# Patient Record
Sex: FEMALE | Race: BLACK OR AFRICAN AMERICAN
[De-identification: names, ages, dates, MRNs, and addresses within clinical notes are randomized per-mention and may not be internally consistent; named-entity substitution may affect disease eponyms.]

---

## 2017-03-06 ENCOUNTER — HOSPITAL ENCOUNTER (EMERGENCY)
Dept: HOSPITAL 74 - JER | Age: 27
Discharge: LEFT BEFORE DISCHARGE | End: 2017-03-06
Payer: COMMERCIAL

## 2017-03-06 VITALS — DIASTOLIC BLOOD PRESSURE: 62 MMHG | HEART RATE: 77 BPM | SYSTOLIC BLOOD PRESSURE: 115 MMHG

## 2017-03-06 VITALS — BODY MASS INDEX: 20.9 KG/M2

## 2017-03-06 VITALS — TEMPERATURE: 97.7 F
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
## 2017-03-06 DIAGNOSIS — S62.636A: ICD-10-CM

## 2017-03-06 DIAGNOSIS — R55: Primary | ICD-10-CM
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
## 2017-03-06 LAB
ALBUMIN SERPL-MCNC: 3.7 G/DL (ref 3.4–5)
ALP SERPL-CCNC: 45 U/L (ref 45–117)
ALT SERPL-CCNC: 11 U/L (ref 12–78)
ANION GAP SERPL CALC-SCNC: 8 MMOL/L (ref 8–16)
APPEARANCE UR: CLEAR
AST SERPL-CCNC: 7 U/L (ref 15–37)
BACTERIA #/AREA URNS HPF: (no result) /HPF
BASOPHILS # BLD: 0.7 % (ref 0–2)
BILIRUB SERPL-MCNC: 0.4 MG/DL (ref 0.2–1)
BILIRUB UR STRIP.AUTO-MCNC: NEGATIVE MG/DL
CALCIUM SERPL-MCNC: 8.3 MG/DL (ref 8.5–10.1)
CO2 SERPL-SCNC: 26 MMOL/L (ref 21–32)
COLOR UR: YELLOW
CREAT SERPL-MCNC: 0.6 MG/DL (ref 0.55–1.02)
DEPRECATED RDW RBC AUTO: 13.6 % (ref 11.6–15.6)
EOSINOPHIL # BLD: 2.1 % (ref 0–4.5)
GLUCOSE SERPL-MCNC: 102 MG/DL (ref 74–106)
HYALINE CASTS URNS QL MICRO: 11 /LPF
KETONES UR QL STRIP: NEGATIVE
LEUKOCYTE ESTERASE UR QL STRIP.AUTO: NEGATIVE
MCH RBC QN AUTO: 30.7 PG (ref 25.7–33.7)
MCHC RBC AUTO-ENTMCNC: 32.6 G/DL (ref 32–36)
MCV RBC: 94.3 FL (ref 80–96)
MUCOUS THREADS URNS QL MICRO: (no result)
NEUTROPHILS # BLD: 66.7 % (ref 42.8–82.8)
NITRITE UR QL STRIP: NEGATIVE
PH UR: 6 [PH] (ref 5–8)
PLATELET # BLD AUTO: 246 K/MM3 (ref 134–434)
PMV BLD: 8.4 FL (ref 7.5–11.1)
PROT SERPL-MCNC: 6.7 G/DL (ref 6.4–8.2)
PROT UR QL STRIP: NEGATIVE
PROT UR QL STRIP: NEGATIVE
RBC # BLD AUTO: 1 /HPF (ref 0–3)
RBC # UR STRIP: (no result) /UL
SP GR UR: 1.02 (ref 1–1.03)
UROBILINOGEN UR STRIP-MCNC: (no result) E.U./DL (ref 0.2–1)
WBC # BLD AUTO: 6.5 K/MM3 (ref 4–10)
WBC # UR AUTO: 1 /HPF (ref 3–5)

## 2017-03-06 PROCEDURE — 2W3JX1Z IMMOBILIZATION OF RIGHT FINGER USING SPLINT: ICD-10-PCS | Performed by: EMERGENCY MEDICINE

## 2017-03-06 NOTE — PDOC
*Physical Exam





- Vital Signs


 Last Vital Signs











Temp Pulse Resp BP Pulse Ox


 


 97.7 F   77   18   115/62   99 


 


 03/06/17 11:27  03/06/17 14:05  03/06/17 14:05  03/06/17 14:05  03/06/17 14:05














ED Treatment Course





- LABORATORY


CBC & Chemistry Diagram: 


 03/06/17 11:45





 03/06/17 11:45





- ADDITIONAL ORDERS


Additional order review: 


 Laboratory  Results











  03/06/17 03/06/17





  12:00 11:45


 


Sodium   142


 


Potassium   4.1


 


Chloride   108 H


 


Carbon Dioxide   26


 


Anion Gap   8


 


BUN   10  D


 


Creatinine   0.6  D


 


Creat Clearance w eGFR   > 60


 


Random Glucose   102


 


Calcium   8.3 L


 


Total Bilirubin   0.4


 


AST   7 L D


 


ALT   11 L D


 


Alkaline Phosphatase   45


 


Total Protein   6.7


 


Albumin   3.7


 


Urine Color  Yellow 


 


Urine Appearance  Clear 


 


Urine pH  6.0 


 


Ur Specific Gravity  1.025 


 


Urine Protein  Negative 


 


Urine Glucose (UA)  Negative 


 


Urine Ketones  Negative 


 


Urine Blood  1+ H 


 


Urine Nitrite  Negative 


 


Urine Bilirubin  Negative 


 


Urine Urobilinogen  2.0 e.u/dl H 


 


Ur Leukocyte Esterase  Negative 


 


Urine RBC  1 


 


Urine WBC  1 


 


Ur Epithelial Cells  Rare 


 


Urine Bacteria  Rare 


 


Hyaline Casts  11 


 


Urine Mucus  Many 


 


Urine HCG, Qual  Negative 








 











  03/06/17





  11:45


 


RBC  3.67


 


MCV  94.3


 


MCHC  32.6


 


RDW  13.6


 


MPV  8.4


 


Neutrophils %  66.7  D


 


Lymphocytes %  23.7  D


 


Monocytes %  6.8


 


Eosinophils %  2.1  D


 


Basophils %  0.7














- RADIOLOGY


Radiology Studies Ordered: 














 Category Date Time Status


 


 HEAD CT WITHOUT CONTRAST [CT] Stat CT Scan  03/06/17 11:57 Completed


 


 FINGER(S) RIGHT [RAD] Stat Radiology  03/06/17 11:55 Completed














- Medications


Given in the ED: 


ED Medications














Discontinued Medications














Generic Name Dose Route Start Last Admin





  Trade Name Freq  PRN Reason Stop Dose Admin


 


Sodium Chloride  1,000 mls @ 1,000 mls/hr 03/06/17 11:57 03/06/17 12:19





  Normal Saline -  IV 03/06/17 12:56  1,000 mls/hr





  ASDIR STA   Administration


 


Ibuprofen  800 mg 03/06/17 11:55 03/06/17 12:19





  Motrin -  PO 03/06/17 11:56  800 mg





  ONCE ONE   Administration


 


Ondansetron HCl  4 mg 03/06/17 11:55 03/06/17 12:19





  Zofran -  PO 03/06/17 11:56  4 mg





  ONCE ONE   Administration














*DC/Admit/Observation/Transfer


Diagnosis at time of Disposition: 


 Finger fracture, right





Syncope


Qualifiers:


 Syncope type: vasovagal syncope Qualified Code(s): R55 - Syncope and collapse





- Discharge Dispostion


Disposition: AGAINST MEDICAL ADVICE


Condition at time of disposition: Stable





- Referrals


Referrals: 


Stephany Dutton [Other]


Dakotah Schultz MD [Staff Physician] - 





- Patient Instructions


Printed Discharge Instructions:  DI for Syncope in Adults (Fainting), Finger 

Fracture


Additional Instructions: 


Please follow-up with your PMD tomorrow.  You can return to ER at any point to 

continue evaluation.


Regarding her finger fracture.  Keep splint in place take Motrin as needed and 

follow-up with orthopedic in 1-2 weeks





- Post Discharge Activity


Work/School Note:  Back to School

## 2017-03-06 NOTE — PDOC
History of Present Illness





- General


Chief Complaint: Syncope/Near Syncope


Stated Complaint: NAUSEA, SYNCOPE, VOMITING


Time Seen by Provider: 03/06/17 11:48


History Source: Patient





- History of Present Illness


Presenting Symptoms: Abdominal Pain, Nausea


Timing/Duration: reports: intermittent





Past History





- Past Medical History


Allergies/Adverse Reactions: 


 Allergies











Allergy/AdvReac Type Severity Reaction Status Date / Time


 


amoxicillin [Amoxicillin] Allergy   Verified 03/06/17 11:26


 


Penicillins Allergy   Verified 03/06/17 11:26











Home Medications: 


Ambulatory Orders





No Home Medications 0 dose .ROUTE UTDICT 07/01/14 








Cardiac Disorders: Yes (syncope)


Suicide Attempt (Hx): No





- Surgical History


Appendectomy: Yes





- Psycho/Social/Smoking Cessation Hx


Anxiety: No


Suicidal Ideation: No


Smoking Status: No


Smoking History: Former smoker


Have you smoked in the past 12 months: No


Number of Cigarettes Smoked Daily: 0


Information on smoking cessation initiated: No


Hx Alcohol Use: No


Drug/Substance Use Hx: No


Substance Use Type: None





**Review of Systems





- Review of Systems


Constitutional: No: Chills, Fever


HEENTM: No: Blurred Vision


Respiratory: No: Shortness of Breath


Cardiac (ROS): Yes: Lightheadedness, Syncope.  No: Chest Pain, Palpitations


ABD/GI: Yes: Nausea, Abdominal cramping


Neurological: Yes: Dizziness.  No: Headache





*Physical Exam





- Vital Signs


 Last Vital Signs











Temp Pulse Resp BP Pulse Ox


 


 97.7 F   77   18   115/62   99 


 


 03/06/17 11:27  03/06/17 14:05  03/06/17 14:05  03/06/17 14:05  03/06/17 14:05














- Physical Exam


Comments: 





03/06/17 12:12


Appears mildly uncomfortable in ED





General Appearance: Yes: Appropriately Dressed


HEENT: positive: Normal Voice


Neck: positive: Supple


Respiratory/Chest: positive: Lungs Clear, Normal Breath Sounds.  negative: 

Respiratory Distress


Cardiovascular: positive: Regular Rate, S1, S2


Gastrointestinal/Abdominal: positive: Soft


Integumentary: positive: Dry, Warm


Neurologic: positive: Fully Oriented, Alert, Normal Mood/Affect, Motor Strength 

5/5.  negative: Facial Droop, Confused





**Heart Score/ECG Review





- ECG Intrepretation


Comment:: 





03/06/17 13:17


Twelve-lead EKG was performed and reviewed by me. There is normal sinus rhythm 

with a normal rate. The axis is normal. The intervals are normal. There are no 

ST or T wave abnormalities.





Impression: Normal twelve-lead EKG





ED Treatment Course





- LABORATORY


CBC & Chemistry Diagram: 


 03/06/17 11:45





 03/06/17 11:45





- ADDITIONAL ORDERS


Additional order review: 


 Laboratory  Results











  03/06/17 03/06/17





  12:00 11:45


 


Sodium   142


 


Potassium   4.1


 


Chloride   108 H


 


Carbon Dioxide   26


 


Anion Gap   8


 


BUN   10  D


 


Creatinine   0.6  D


 


Creat Clearance w eGFR   > 60


 


Random Glucose   102


 


Calcium   8.3 L


 


Total Bilirubin   0.4


 


AST   7 L D


 


ALT   11 L D


 


Alkaline Phosphatase   45


 


Total Protein   6.7


 


Albumin   3.7


 


Urine Color  Yellow 


 


Urine Appearance  Clear 


 


Urine pH  6.0 


 


Ur Specific Gravity  1.025 


 


Urine Protein  Negative 


 


Urine Glucose (UA)  Negative 


 


Urine Ketones  Negative 


 


Urine Blood  1+ H 


 


Urine Nitrite  Negative 


 


Urine Bilirubin  Negative 


 


Urine Urobilinogen  2.0 e.u/dl H 


 


Ur Leukocyte Esterase  Negative 


 


Urine RBC  1 


 


Urine WBC  1 


 


Ur Epithelial Cells  Rare 


 


Urine Bacteria  Rare 


 


Hyaline Casts  11 


 


Urine Mucus  Many 


 


Urine HCG, Qual  Negative 








 











  03/06/17





  11:45


 


RBC  3.67


 


MCV  94.3


 


MCHC  32.6


 


RDW  13.6


 


MPV  8.4


 


Neutrophils %  66.7  D


 


Lymphocytes %  23.7  D


 


Monocytes %  6.8


 


Eosinophils %  2.1  D


 


Basophils %  0.7














- RADIOLOGY


Radiology Studies Ordered: 














 Category Date Time Status


 


 HEAD CT WITHOUT CONTRAST [CT] Stat CT Scan  03/06/17 11:57 Completed


 


 FINGER(S) RIGHT [RAD] Stat Radiology  03/06/17 11:55 Completed














- Medications


Given in the ED: 


ED Medications














Discontinued Medications














Generic Name Dose Route Start Last Admin





  Trade Name Freq  PRN Reason Stop Dose Admin


 


Sodium Chloride  1,000 mls @ 1,000 mls/hr 03/06/17 11:57 03/06/17 12:19





  Normal Saline -  IV 03/06/17 12:56  1,000 mls/hr





  ASDIR STA   Administration


 


Ibuprofen  800 mg 03/06/17 11:55 03/06/17 12:19





  Motrin -  PO 03/06/17 11:56  800 mg





  ONCE ONE   Administration


 


Ondansetron HCl  4 mg 03/06/17 11:55 03/06/17 12:19





  Zofran -  PO 03/06/17 11:56  4 mg





  ONCE ONE   Administration














Medical Decision Making





- Medical Decision Making


03/06/17 11:56


26-year-old female, endorses history of recurrent syncope, here with multiple 

syncopal episodes over the past 3 days.  Patient states 3 days ago while home, 

she became dizzy and passed out and woke up on the floor. Did not not have any 

headache immediately after event but does report R 5th finger pain and 

swelling.  States she stayed in bed all day yesterday and felt better, but this 

a.m. while standing outdoors, pt again became lightheaded and passed out and 

again awoke on the floor.  States event this am was witnessed by bystanders and 

no seizure-like activities reported.  Patient reports feeling generally weak 

right now and "dehydrated." Reports having had similar syncopal events for the 

past several years, usually goes to the ER and discharged but has never seen a 

neurologist or cardiologist.  Pt states she noticed that symptoms occur usually 

around the time of her menses and states she suffers form very painful periods. 

Reports that menses came around 5am today and now complaining of her usual 

lower abdominal cramping and nausea. Pt denies vertigo, visual changes, slurred 

speech, HA or focal weakness. No CP or SOB








See exam








Recurrent syncope


Usually triggered by menses


Seen in outside EDs


No neuro/cards w/u in past


Stable in ED w/ unremarkable exam


?vasovagal, r/o orthostasis, unlikely cardiogenic


-ekg


-labs


-pain control


-zofran











03/06/17 12:08








03/06/17 13:16








03/06/17 14:05


Labs/ekg/CT head unremarkable. Pt stable in ED. Case d/w ED attg and decision 

was made to admit to obs for cards and neuro w/u given no workup in past and 

multiple syncopal events in 3 days w/ injury





03/06/17 14:07








03/06/17 14:30


Patient refusing to be admitted.  States she wants to sign out AGAINST MEDICAL 

ADVICE.  Medical risks explained to patient, but still refusing admission. 

States she will walk into her PMDs office tomorrow.  Patient understands that 

she can return at any point to continue evaluation.  Given finger splint and 

orthopedic follow-up for finger fracture. Copy of labs also given to pt





03/06/17 14:37








*DC/Admit/Observation/Transfer


Diagnosis at time of Disposition: 


 Finger fracture, right





Syncope


Qualifiers:


 Syncope type: vasovagal syncope Qualified Code(s): R55 - Syncope and collapse





- Discharge Dispostion


Disposition: AGAINST MEDICAL ADVICE


Condition at time of disposition: Stable





- Referrals


Referrals: 


Stephany Dutton [Other]


Dakotah Schultz MD [Staff Physician] - 





- Patient Instructions


Printed Discharge Instructions:  DI for Syncope in Adults (Fainting), Finger 

Fracture


Additional Instructions: 


Please follow-up with your PMD tomorrow.  You can return to ER at any point to 

continue evaluation.


Regarding her finger fracture.  Keep splint in place take Motrin as needed and 

follow-up with orthopedic in 1-2 weeks

## 2017-03-07 NOTE — EKG
Test Reason : 

Blood Pressure : ***/*** mmHG

Vent. Rate : 065 BPM     Atrial Rate : 065 BPM

   P-R Int : 134 ms          QRS Dur : 078 ms

    QT Int : 376 ms       P-R-T Axes : 034 002 029 degrees

   QTc Int : 391 ms

 

NORMAL SINUS RHYTHM

NORMAL ECG

WHEN COMPARED WITH ECG OF 08-MAR-2012 05:34,

VENT. RATE HAS DECREASED BY  45 BPM

T WAVE VARIATION

Confirmed by TONJA TY MD ([id number]) on 3/7/2017 11:02:19 AM

 

Referred By:             Confirmed By:TONJA TY MD

## 2017-05-07 ENCOUNTER — HOSPITAL ENCOUNTER (EMERGENCY)
Dept: HOSPITAL 74 - FER | Age: 27
Discharge: HOME | End: 2017-05-07
Payer: COMMERCIAL

## 2017-05-07 VITALS — DIASTOLIC BLOOD PRESSURE: 95 MMHG | HEART RATE: 74 BPM | TEMPERATURE: 98.8 F | SYSTOLIC BLOOD PRESSURE: 138 MMHG

## 2017-05-07 VITALS — BODY MASS INDEX: 20.9 KG/M2

## 2017-05-07 DIAGNOSIS — Z87.891: ICD-10-CM

## 2017-05-07 DIAGNOSIS — O20.0: Primary | ICD-10-CM

## 2017-05-07 DIAGNOSIS — Z3A.00: ICD-10-CM

## 2017-05-07 NOTE — PDOC
History of Present Illness





- General


History Source: Patient


Exam Limitations: No Limitations





- History of Present Illness


Initial Comments: 





17 21:37


The patient is a 26 year old female, with no significant past medical history, 

who presents to the emergency department with complains of prolonged menstrual 

bleeding since April. The patient reports that she has recently taken 4 

pregnancy tests that all came back positive. The patient notes that she got her 

period on  and the bleeding has been persistent since. She notes that 

the blood is brown in color. She states that her period is normally regular. 

She denies having any pain. 





Allergies: amoxicillin, penicillins


Past surgical history: appendectomy


Social history: Former smoker, social alcohol use





<Karla Palm - Last Filed: 17 21:37>





<Deven Ryan - Last Filed: 17 03:34>





- General


Chief Complaint: Vaginal Bleeding


Stated Complaint: PROLONGED MENSTRUAL BLEEDING


Time Seen by Provider: 17 21:27





Past History





<Karla Palm - Last Filed: 17 21:37>





- Past Medical History


Cardiac Disorders: Yes (syncope)


Suicide Attempt (Hx): No





- Surgical History


Appendectomy: Yes





- Reproductive History


Is Patient Pregnant Now?: Yes





- Psycho/Social/Smoking Cessation Hx


Anxiety: No


Suicidal Ideation: No


Smoking Status: No


Smoking History: Former smoker


Have you smoked in the past 12 months: No


Number of Cigarettes Smoked Daily: 0


Information on smoking cessation initiated: No


Hx Alcohol Use: Yes (SOCIAL)


Drug/Substance Use Hx: No


Substance Use Type: None





<Deven Ryan - Last Filed: 17 03:34>





- Past Medical History


Allergies/Adverse Reactions: 


 Allergies











Allergy/AdvReac Type Severity Reaction Status Date / Time


 


amoxicillin [Amoxicillin] Allergy   Verified 17 11:26


 


Penicillins Allergy   Verified 17 11:26











Home Medications: 


Ambulatory Orders





No Home Medications 0 dose .ROUTE UTDICT 14 


Prenatal Vit #108/Iron/FA [Prenatal One Tablet] 1 each PO DAILY #30 tablet  











*Physical Exam





- Vital Signs


 Last Vital Signs











Temp Pulse Resp BP Pulse Ox


 


 98.8 F   74   15   138/95   97 


 


 17 21:05  17 21:05  17 21:05  17 21:05  17 21:05














<Karla Palm - Last Filed: 17 21:37>





- Vital Signs


 Last Vital Signs











Temp Pulse Resp BP Pulse Ox


 


 98.8 F   74   15   138/95   97 


 


 17 21:05  17 21:05  17 21:05  17 21:05  17 21:05














- Physical Exam


General Appearance: Yes: Nourished


HEENT: positive: Normal Voice


Respiratory/Chest: positive: Lungs Clear, Normal Breath Sounds


Cardiovascular: positive: Regular Rate


Female Pelvic Exam: positive: normal external exam, cervical os closed, normal 

size ovaries, other (scant bleeding from os).  negative: adnexal tenderness, 

uterus


Lymphatic: negative: Adenopathy


Musculoskeletal: positive: Normal Inspection


Extremity: positive: Normal Capillary Refill


Integumentary: positive: Normal Color


Neurologic: positive: Fully Oriented





<Deven Ryan - Last Filed: 17 03:34>





ED Treatment Course





- ADDITIONAL ORDERS


Additional order review: 


 Laboratory  Results











  17





  21:21


 


Urine HCG, Qual  Positive














<Karla Palm - Last Filed: 17 21:37>





Medical Decision Making





- Medical Decision Making





17 03:32


POCUS: endometrial stripe, CL cyst R ovary, no ff





Blood type: O+


a/p early pregnancy


ectopic not ruled out but nothing on hx of pe suggests this





has gyn for fu


PNV





<Deven Ryan - Last Filed: 17 03:34>





*DC/Admit/Observation/Transfer





- Attestations


Scribe Attestion: 





17 21:38





Documentation prepared by MADISON Roach, acting as medical scribe for 

Deven Ryan MD.





<Karla Palm - Last Filed: 17 21:37>





<Deven Ryan - Last Filed: 17 03:34>


Diagnosis at time of Disposition: 


 Threatened miscarriage in early pregnancy





- Discharge Dispostion


Disposition: HOME


Condition at time of disposition: Good





- Prescriptions


Prescriptions: 


Prenatal Vit #108/Iron/FA [Prenatal One Tablet] 1 each PO DAILY #30 tablet





- Patient Instructions


Printed Discharge Instructions:  DI for Threatened 


Additional Instructions: 


follow up with your gynecologist as soon as possible

## 2017-05-12 ENCOUNTER — HOSPITAL ENCOUNTER (EMERGENCY)
Dept: HOSPITAL 74 - JER | Age: 27
LOS: 1 days | Discharge: HOME | End: 2017-05-13
Payer: COMMERCIAL

## 2017-05-12 VITALS — HEART RATE: 77 BPM | SYSTOLIC BLOOD PRESSURE: 138 MMHG | TEMPERATURE: 97.4 F | DIASTOLIC BLOOD PRESSURE: 76 MMHG

## 2017-05-12 VITALS — BODY MASS INDEX: 25 KG/M2

## 2017-05-12 DIAGNOSIS — O02.1: Primary | ICD-10-CM

## 2017-05-12 DIAGNOSIS — Z3A.01: ICD-10-CM

## 2017-05-12 LAB
BASOPHILS # BLD: 1.1 % (ref 0–2)
DEPRECATED RDW RBC AUTO: 15.3 % (ref 11.6–15.6)
EOSINOPHIL # BLD: 2.4 % (ref 0–4.5)
MCH RBC QN AUTO: 29.1 PG (ref 25.7–33.7)
MCHC RBC AUTO-ENTMCNC: 32.8 G/DL (ref 32–36)
MCV RBC: 88.9 FL (ref 80–96)
NEUTROPHILS # BLD: 49.3 % (ref 42.8–82.8)
PLATELET # BLD AUTO: 252 K/MM3 (ref 134–434)
PMV BLD: 8.6 FL (ref 7.5–11.1)
WBC # BLD AUTO: 6.9 K/MM3 (ref 4–10)

## 2017-05-12 NOTE — PDOC
History of Present Illness





- History of Present Illness


Initial Comments: 





05/13/17 00:24


Patient is a 26 year old female with no significant medical hx who is 

presenting to the ED with persistent vaginal spotting since 04/27/17. The 

patient reports passing small amounts of clots over the past few weeks. She 

recently took a pregnancy test that was found positive. Several days ago the 

patient was tested and found to have a beta hCG of 831 and upon repeat went 

down to 793. Patient also notes having irregular menses. The patient denies any 

other symptoms including abdominal pain, nausea, vomiting, fever, chills. She 

came to the ED for further evaluation of her spotting. 


Patient states she's been seen by multiple doctors, including a visit to [facility] on 05/07, for the same complaint.





Allergies: amoxicillin, penicillins


Past surgical history: appendectomy


Social history: Former smoker, social alcohol use





<Wendy Camp - Last Filed: 05/13/17 02:06>





- General


History Source: Patient


Exam Limitations: No Limitations





<Antoine Mays - Last Filed: 05/13/17 02:09>





- General


Stated Complaint: VAGINAL BLEEDING/CRAMPS


Time Seen by Provider: 05/12/17 23:33





Past History





<Wendy Camp - Last Filed: 05/13/17 02:06>





- Past Medical History


Cardiac Disorders: Yes (syncope)


Suicide Attempt (Hx): No





- Surgical History


Appendectomy: Yes





- Psycho/Social/Smoking Cessation Hx


Anxiety: No


Suicidal Ideation: No


Smoking Status: No


Smoking History: Former smoker


Have you smoked in the past 12 months: No


Number of Cigarettes Smoked Daily: 0


Hx Alcohol Use: Yes (SOCIAL)


Drug/Substance Use Hx: No


Substance Use Type: None





<Antoine Mays - Last Filed: 05/13/17 02:09>





- Past Medical History


Allergies/Adverse Reactions: 


 Allergies











Allergy/AdvReac Type Severity Reaction Status Date / Time


 


amoxicillin [Amoxicillin] Allergy   Verified 05/12/17 23:50


 


Penicillins Allergy   Verified 05/12/17 23:50











Home Medications: 


Ambulatory Orders





Prenatal Vit #108/Iron/FA [Prenatal One Tablet] 1 each PO DAILY #30 tablet 05/07 /17 











**Review of Systems





- Review of Systems


Comments:: 


05/13/17 00:33


GENERAL/CONSTITUTIONAL: No fever or chills. No weakness.


HEAD, EYES, EARS, NOSE AND THROAT: No change in vision. No ear pain or 

discharge. No sore throat.


CARDIOVASCULAR: No chest pain or shortness of breath.


RESPIRATORY: No cough, wheezing, or hemoptysis.


GASTROINTESTINAL: No nausea, vomiting, diarrhea or constipation.


GENITOURINARY: Vaginal spotting. No dysuria, frequency, or change in urination.


MUSCULOSKELETAL: No joint or muscle swelling or pain. No neck or back pain.


SKIN: No rash


NEUROLOGIC: No headache, vertigo, loss of consciousness, or change in strength/

sensation.





<Wendy Camp - Last Filed: 05/13/17 02:06>





*Physical Exam





- Vital Signs


 Last Vital Signs











Temp Pulse Resp BP Pulse Ox


 


 97.4 F L  77   18   138/76   100 


 


 05/12/17 23:51  05/12/17 23:51  05/12/17 23:51  05/12/17 23:51  05/12/17 23:51














- Physical Exam


Comments: 


05/13/17 00:34


GENERAL: Awake, alert, and fully oriented, in no acute distress


HEAD: No signs of trauma


EYES: PERRLA, EOMI, sclera anicteric, conjunctiva clear


ENT: Auricles normal inspection, hearing grossly normal, nares patent, 

oropharynx clear without 


exudates. Moist mucosa


NECK: Normal ROM, supple, no lymphadenopathy, JVD, or masses


LUNGS: Breath sounds equal, clear to auscultation bilaterally.  No wheezes, and 

no crackles


HEART: Regular rate and rhythm, normal S1 and S2, no murmurs, rubs or gallops


ABDOMEN: Soft, nontender, normoactive bowel sounds.  No guarding, no rebound.  

No masses


EXTREMITIES: Normal range of motion, no edema.  No clubbing or cyanosis. No 

cords, erythema, or tenderness


NEUROLOGICAL: Cranial nerves II through XII grossly intact.  Normal speech, 

normal gait


SKIN: Warm, Dry, normal turgor, no rashes or lesions noted.


ENDOCRINE: No increased thirst. No abnormal weight change.


HEMATOLOGIC/LYMPHATIC: No anemia, easy bleeding, or history of blood clots.


ALLERGIC/IMMUNOLOGIC: No hives or skin allergy.








<Wendy Camp - Last Filed: 05/13/17 02:06>





ED Treatment Course





- LABORATORY


CBC & Chemistry Diagram: 


 05/12/17 23:45





 05/12/17 23:45





- ADDITIONAL ORDERS


Additional order review: 


 











  05/12/17





  23:45


 


RBC  4.01


 


MCV  88.9


 


MCHC  32.8


 


RDW  15.3  D


 


MPV  8.6


 


Neutrophils %  49.3  D


 


Lymphocytes %  36.8  D


 


Monocytes %  10.4 H


 


Eosinophils %  2.4


 


Basophils %  1.1














- RADIOLOGY


Radiograph Interpretation: 


05/13/17 02:06


: (torin) 


Report Date: 05/13/2017 00:39:00 


Report Status: Preliminary 


======================= Begin of Report Content ======================  


Referring Physician: Antoine Mays 


Patient Name: Shell Charles 


THIS IS A PRELIMINARY REPORT FROM [facility]   


EXAM: Ultrasound pregnant first trimester and pelvic duplex  


IMAGES: 49  


INDICATION: First trimester bleeding. ACG level DXL  


DATE OF SERVICE: 2017-05-13 00:39:52.0  


COMPARISON: none  


FINDINGS: Ultrasound pregnancy:Uterus is anteverted and measures 7.0centimeters 

in length. The endometrium is 3millimeters in thickness which is normal no 

identified. The right ovary measures 3.8centimeters in length, appears normal 

and demonstrates normal flow. Left ovary measures 3.4centimeters in length 

appears normal demonstrates normal flow. There is no significant free fluid.  

Pelvic duplex: There is normal arterial flow in both ovaries.   


IMPRESSION: Unremarkable exam without IUP identified. Differential diagnosis 

includes early normal pregnancy, miscarriage or ectopic pregnancy and followup 

sonography with correlation hCG levels is recommended. 


THIS DOCUMENT HAS BEEN ELECTRONICALLY SIGNED 


Gianni Rao MD 05/13/2017 02:02 EST 


M.D. Please call [facility] [phone] ([phone]) with questions.  


======================== End of Report Content ======================





<Wendy Camp - Last Filed: 05/13/17 02:06>





- LABORATORY


CBC & Chemistry Diagram: 


 05/12/17 23:45





 05/12/17 23:45





- RADIOLOGY


Radiology Studies Ordered: 














 Category Date Time Status


 


 TRANSVAGINAL US PREG [US] Stat Ultrasound  05/12/17 23:46 Ordered














<Antoine Mays - Last Filed: 05/13/17 02:09>





Medical Decision Making





- Medical Decision Making


05/12/17 23:52





A portion of this note was documented by scribe services under my direction. I 

have reviewed the details of the note, within reason, and agree with the 

documentation with the following case summary and management plan written by 

me. 





Patient treated in the ED.





Nursing notes are reviewed and incorporated into the medical decision-making.


Vital signs reviewed.





Peripheral IV access obtained by the nurse, laboratory studies are drawn and 

sent, reviewed and interpreted by myself. 





 Vital Signs











Temp Pulse Resp BP Pulse Ox


 


 97.4 F L  77   18   138/76   100 


 


 05/12/17 23:51  05/12/17 23:51  05/12/17 23:51  05/12/17 23:51  05/12/17 23:51








 26-year-old female with no medical history, irregular periods, presents with 

vaginal bleeding since April 27. The patient reports that she has been 

spotting. She was unclear if this was her irregular.. She had taken a pregnancy 

test and she was positive. Patient was having vaginal spotting and was seen at 

[facility] on May 7. She was discharged that time and has had subsequent 

visits were doctors. Patient several days ago had a beta hCG of 831 and repeat 

was 793. Patient came in because she is still persistently spotting. Denies any 

abdominal pain. Reports that a small amounts of clots.





Will obtain a repeat beta hcg and transvaginal ultrasound and reassess.





05/13/17 01:39





 CBC, BMP





 05/12/17 23:45 





 05/12/17 23:45 





 CMP











Sodium  141 mmol/L (136-145)   05/12/17  23:45    


 


Potassium  4.1 mmol/L (3.5-5.1)   05/12/17  23:45    


 


Chloride  103 mmol/L ()   05/12/17  23:45    


 


Carbon Dioxide  27 mmol/L (21-32)   05/12/17  23:45    


 


Anion Gap  11  (8-16)   05/12/17  23:45    


 


BUN  10 mg/dL (7-18)   05/12/17  23:45    


 


Creatinine  0.7 mg/dL (0.55-1.02)   05/12/17  23:45    


 


Creat Clearance w eGFR  > 60  (>60)   05/12/17  23:45    


 


Random Glucose  80 mg/dL ()  D 05/12/17  23:45    


 


Calcium  8.7 mg/dL (8.5-10.1)   05/12/17  23:45    


 


Total Bilirubin  0.5 mg/dL (0.2-1.0)  D 05/12/17  23:45    


 


AST  9 U/L (15-37)  L D 05/12/17  23:45    


 


ALT  15 U/L (12-78)  D 05/12/17  23:45    


 


Alkaline Phosphatase  50 U/L ()   05/12/17  23:45    


 


Total Protein  7.7 g/dl (6.4-8.2)   05/12/17  23:45    


 


Albumin  4.1 g/dl (3.4-5.0)   05/12/17  23:45    


 


Beta HCG, Quant  540.1 mIU/ml  05/12/17  23:45    








The beta is downtrending.





05/13/17 01:39


Blood type is Rh positive. Pt is likely with miscarriage. 


Will await ultrasound, but the patient should follow up with her ob/gyn for 

further management and disposition.





05/13/17 02:00





Pt reports that she had an ultrasound earlier this week (a 2nd one, that again 

that didn't show anything).


She states that her ride is leaving and would like to call back for her results.


I had informed her that she can follow up and call back tomorrow.


Return precautions given including worsening bleed.





Given that the bleeding has occurred since end of April, with a downtrending 

beta HCG, will allow her to go home and call back for the results.


Likely discharge diagnosis: miscarriage.





I discussed the physical exam findings, ancillary test results and final 

diagnoses with the patient.  I answered all of the patient's questions.  The 

patient was satisfied with the care received and felt comfortable with the 

discharge plan and treatment plan.  The patient will call their primary care 

physician within 24 hours to arrange follow-up and will return to the Emergency 

Department with any new, persistant or worsening symptoms. 





05/13/17 02:08


I was able to inform the patient of the results of the ultrasound.


No identifiable of IUP (which we surmised)





<Antoine Mays - Last Filed: 05/13/17 02:09>





*DC/Admit/Observation/Transfer





- Attestations


Scribe Attestion: 


05/13/17 00:34


Documentation prepared by Wendy Camp, acting as medical scribe for Antoine Mays MD.





<Wendy Camp - Last Filed: 05/13/17 02:06>





- Discharge Dispostion


Admit: No





<Antoine Mays - Last Filed: 05/13/17 02:09>


Diagnosis at time of Disposition: 


 Miscarriage





- Discharge Dispostion


Disposition: HOME


Condition at time of disposition: Stable





- Referrals


Referrals: 


Mikaela Regalado MD [Staff Physician] - 


Frederic Brody MD [Staff Physician] - 


Feliciano Trejo MD [Staff Physician] - 





- Patient Instructions


Printed Discharge Instructions:  DI for Vaginal Bleeding During Pregnancy


Additional Instructions: 


Your beta HCG is 540 today.


Please call back tomorrow for the results of your ultrasound.


Call [phone] option 1.


Please follow up with your ob/gyn doctor.


If you have uncontrollable bleeding, please return to the ER for further 

evaluation.

## 2017-05-13 LAB
ALBUMIN SERPL-MCNC: 4.1 G/DL (ref 3.4–5)
ALP SERPL-CCNC: 50 U/L (ref 45–117)
ALT SERPL-CCNC: 15 U/L (ref 12–78)
ANION GAP SERPL CALC-SCNC: 11 MMOL/L (ref 8–16)
AST SERPL-CCNC: 9 U/L (ref 15–37)
BILIRUB SERPL-MCNC: 0.5 MG/DL (ref 0.2–1)
CALCIUM SERPL-MCNC: 8.7 MG/DL (ref 8.5–10.1)
CO2 SERPL-SCNC: 27 MMOL/L (ref 21–32)
COCKROFT - GAULT: 128.51
CREAT SERPL-MCNC: 0.7 MG/DL (ref 0.55–1.02)
GLUCOSE SERPL-MCNC: 80 MG/DL (ref 74–106)
PROT SERPL-MCNC: 7.7 G/DL (ref 6.4–8.2)

## 2017-11-12 ENCOUNTER — HOSPITAL ENCOUNTER (EMERGENCY)
Dept: HOSPITAL 74 - JER | Age: 27
Discharge: HOME | End: 2017-11-12
Payer: COMMERCIAL

## 2017-11-12 VITALS — SYSTOLIC BLOOD PRESSURE: 105 MMHG | HEART RATE: 76 BPM | DIASTOLIC BLOOD PRESSURE: 67 MMHG

## 2017-11-12 VITALS — TEMPERATURE: 97.6 F

## 2017-11-12 VITALS — BODY MASS INDEX: 20.9 KG/M2

## 2017-11-12 DIAGNOSIS — O26.891: Primary | ICD-10-CM

## 2017-11-12 DIAGNOSIS — O20.0: ICD-10-CM

## 2017-11-12 DIAGNOSIS — Z3A.01: ICD-10-CM

## 2017-11-12 LAB
ALBUMIN SERPL-MCNC: 4 G/DL (ref 3.4–5)
ALP SERPL-CCNC: 42 U/L (ref 45–117)
ALT SERPL-CCNC: 15 U/L (ref 12–78)
ANION GAP SERPL CALC-SCNC: 7 MMOL/L (ref 8–16)
APPEARANCE UR: (no result)
AST SERPL-CCNC: 8 U/L (ref 15–37)
BASOPHILS # BLD: 0.6 % (ref 0–2)
BILIRUB SERPL-MCNC: 0.5 MG/DL (ref 0.2–1)
BILIRUB UR STRIP.AUTO-MCNC: NEGATIVE MG/DL
CALCIUM SERPL-MCNC: 8.7 MG/DL (ref 8.5–10.1)
CO2 SERPL-SCNC: 26 MMOL/L (ref 21–32)
COLOR UR: YELLOW
CREAT SERPL-MCNC: 0.5 MG/DL (ref 0.55–1.02)
DEPRECATED RDW RBC AUTO: 14.5 % (ref 11.6–15.6)
EOSINOPHIL # BLD: 0.6 % (ref 0–4.5)
GLUCOSE SERPL-MCNC: 87 MG/DL (ref 74–106)
KETONES UR QL STRIP: (no result)
LEUKOCYTE ESTERASE UR QL STRIP.AUTO: (no result)
MCH RBC QN AUTO: 29.9 PG (ref 25.7–33.7)
MCHC RBC AUTO-ENTMCNC: 33.4 G/DL (ref 32–36)
MCV RBC: 89.6 FL (ref 80–96)
NEUTROPHILS # BLD: 71 % (ref 42.8–82.8)
NITRITE UR QL STRIP: NEGATIVE
PH UR: 5 [PH] (ref 5–8)
PLATELET # BLD AUTO: 274 K/MM3 (ref 134–434)
PMV BLD: 8 FL (ref 7.5–11.1)
PROT SERPL-MCNC: 7.2 G/DL (ref 6.4–8.2)
PROT UR QL STRIP: NEGATIVE
PROT UR QL STRIP: NEGATIVE
RBC # UR STRIP: NEGATIVE /UL
SP GR UR: 1.02 (ref 1–1.03)
UROBILINOGEN UR STRIP-MCNC: NEGATIVE MG/DL (ref 0.2–1)
WBC # BLD AUTO: 9.4 K/MM3 (ref 4–10)

## 2017-11-12 NOTE — PDOC
Attending Attestation





- Resident


Resident Name: Horace Toney





- ED Attending Attestation


I have performed the following: I have examined & evaluated the patient, The 

case was reviewed & discussed with the resident, I agree w/resident's findings 

& plan, Exceptions are as noted





- HPI


HPI: 





17 19:06


26 yo female who is preg and has c/o vaginal spotting . LMP 9/15/17   


17 19:15








- Physicial Exam


PE: 





17 19:16


WNWD 26 yo  p/w vaginal spotting, in no acute distress


17 21:37


Well-nourished, well-developed 27-year-old female with complaint of vaginal 

spotting.


Head normocephalic/atraumatic.


Eyes PERRLA, EOMI


Neck is supple, no JVD.





Lungs are clear to auscultation bilaterally


CVS regular rate and rhythm S1, S2 no gallops no rubs.


Abdomen nontender.  No rebound or guarding.


Pelvic the study by Dr. Toney and did not reveal any vaginal clots and the   

Cervical os is closed


Extremities no pain, edema


Neuro alert and oriented 3 ambulating with ease.  No gross focal neural 

deficits





- Medical Decision Making





17 21:39


Beta-hCG was only 906


Pelvic ultrasound showed a thickened endometrial stripe.  There was no 

intrauterine pregnancy detected, there was no ovarian torsion, there is no free 

fluid in the pelvis.


Impression threatened AB versus pregnancy versus ectopic.


Plan patient must return within 48 hours and have repeat beta hCG and pelvic 

ultrasound

## 2017-11-12 NOTE — PDOC
History of Present Illness





- General


Chief Complaint: Vaginal Bleeding


Stated Complaint: VAGINAL BLEEDING


Time Seen by Provider: 17 17:59





- History of Present Illness


Initial Comments: 





17 01:14


27F found pregnant on last admission 2 days ago, comes back with brown 

discharge and spotting and faint abdominal paint. 


last menstrual period 17.


[id number]. Missed  in May. 


On last visit only qualitative HcG was done.


No other complaints





Past History





- Past Medical History


Allergies/Adverse Reactions: 


 Allergies











Allergy/AdvReac Type Severity Reaction Status Date / Time


 


amoxicillin [Amoxicillin] Allergy   Verified 17 17:51


 


Penicillins Allergy   Verified 17 17:51











Home Medications: 


Ambulatory Orders





NK [No Known Home Medication]  17 








Cardiac Disorders: Yes (Syncope with menstruation)


COPD: No


DVT: No


Other medical history: denies





- Surgical History


Appendectomy: Yes





- Reproductive History


Spontaneous : 1





- Immunization History


Immunization Up to Date: Yes





- Suicide/Smoking/Psychosocial Hx


Smoking Status: No


Smoking History: Never smoked


Have you smoked in the past 12 months: No


Number of Cigarettes Smoked Daily: 0


If you are a former smoker, when did you quit?: 2MONTHS


Information on smoking cessation initiated: No


Hx Alcohol Use: No


Drug/Substance Use Hx: No


Substance Use Type: None





**Review of Systems





- Review of Systems


Constitutional: No: Symptoms Reported


HEENTM: No: Symptoms Reported


Respiratory: No: Symptoms reported


Cardiac (ROS): No: Symptoms Reported


ABD/GI: No: Symptoms Reported


: No: Symptoms Reported


Musculoskeletal: No: Symptoms Reported


Integumentary: No: Symptoms Reported


Neurological: No: Symptoms reported


Endocrine: No: Symptoms Reported


Hematologic/Lymphatic: No: Symptoms Reported


All Other Systems: Reviewed and Negative





*Physical Exam





- Vital Signs


 Last Vital Signs











Temp Pulse Resp BP Pulse Ox


 


 97.6 F   74   18   126/69   100 


 


 17 17:48  17 17:48  17 17:48  17 17:48  17 17:48














- Physical Exam


General Appearance: Yes: Nourished, Appropriately Dressed.  No: Apparent 

Distress


HEENT: positive: EOMI, FLORIAN, Normal ENT Inspection


Neck: positive: Trachea midline, Normal Thyroid.  negative: Tender


Respiratory/Chest: positive: Lungs Clear, Normal Breath Sounds.  negative: 

Chest Tender


Cardiovascular: positive: Regular Rhythm, Regular Rate, S1, S2


Female Pelvic Exam: positive: normal external exam, cervical os closed, 

discharge (leukorrhea, no visible blood or products of conception)


Gastrointestinal/Abdominal: positive: Normal Bowel Sounds, Flat, Soft.  negative

: Tender


Musculoskeletal: positive: Normal Inspection


Extremity: positive: Normal Capillary Refill


Integumentary: positive: Normal Color, Dry, Warm


Neurologic: positive: Fully Oriented, Alert, Normal Mood/Affect, Normal Response

, Motor Strength 





ED Treatment Course





- LABORATORY


CBC & Chemistry Diagram: 


 17 18:23





 17 18:23





Medical Decision Making





- Medical Decision Making





17 01:22


27F 7 weeks pregnant from last period, present with remy discharge. 


beta hcg 906, on t/v u/s no fetal sac or pole noticed in uterus or in ovaries. 


Cannot r/o very early pregnancy or ectopic preg or complete . 


Patient told to come back in 48 hours to repeat labs and imaging.





*DC/Admit/Observation/Transfer


Diagnosis at time of Disposition: 


 Threatened miscarriage in early pregnancy








- Discharge Dispostion


Disposition: HOME


Admit: No





- Referrals





- Patient Instructions


Printed Discharge Instructions:  DI for Threatened 


Additional Instructions: 


come back to ED in 48h to repeat labs and imaging.


Come  back for any new, worsening or concerning symptoms





- Post Discharge Activity

## 2017-11-13 LAB
RBC # BLD AUTO: (no result) /HPF (ref 0–3)
WBC # UR AUTO: (no result) /UL (ref 0–5)

## 2017-11-14 ENCOUNTER — HOSPITAL ENCOUNTER (EMERGENCY)
Dept: HOSPITAL 74 - JER | Age: 27
Discharge: HOME | End: 2017-11-14
Payer: COMMERCIAL

## 2017-11-14 VITALS — BODY MASS INDEX: 20.9 KG/M2

## 2017-11-14 VITALS — TEMPERATURE: 97.7 F | SYSTOLIC BLOOD PRESSURE: 112 MMHG | DIASTOLIC BLOOD PRESSURE: 67 MMHG | HEART RATE: 72 BPM

## 2017-11-14 DIAGNOSIS — O20.0: Primary | ICD-10-CM

## 2017-11-14 DIAGNOSIS — Z3A.01: ICD-10-CM

## 2017-11-14 LAB
APPEARANCE UR: CLEAR
BACTERIA #/AREA URNS HPF: (no result) /HPF
BILIRUB UR STRIP.AUTO-MCNC: NEGATIVE MG/DL
COLOR UR: (no result)
KETONES UR QL STRIP: NEGATIVE
LEUKOCYTE ESTERASE UR QL STRIP.AUTO: (no result)
NITRITE UR QL STRIP: NEGATIVE
PH UR: 6 [PH] (ref 5–8)
PROT UR QL STRIP: NEGATIVE
PROT UR QL STRIP: NEGATIVE
RBC # BLD AUTO: (no result) /HPF (ref 0–3)
RBC # UR STRIP: NEGATIVE /UL
SP GR UR: 1.01 (ref 1–1.03)
UROBILINOGEN UR STRIP-MCNC: NEGATIVE MG/DL (ref 0.2–1)
WBC # UR AUTO: (no result) /UL (ref 0–5)

## 2017-11-14 NOTE — PDOC
History of Present Illness





- General


Chief Complaint: Revisit, Lab Variance


Stated Complaint: REVISIT/BLOODWORK


Time Seen by Provider: 17 16:43


History Source: Patient


Exam Limitations: No Limitations





- History of Present Illness


Initial Comments: 





17 18:38


27 yr female with c/o spotting for 3 days seen here 2 days ago told to come 

back for repeat beta HCG and US. pt denies cramping or pain no vaginal 

bleeding. Pt has history of miscarriage last April. 





Past History





- Past Medical History


Allergies/Adverse Reactions: 


 Allergies











Allergy/AdvReac Type Severity Reaction Status Date / Time


 


amoxicillin [Amoxicillin] Allergy   Verified 17 16:44


 


Penicillins Allergy   Verified 17 16:44











Home Medications: 


Ambulatory Orders





NK [No Known Home Medication]  17 








Cardiac Disorders: Yes (Syncope with menstruation)


COPD: No


DVT: No





- Surgical History


Appendectomy: Yes





- Reproductive History


 (#): 2


Para: 0


Spontaneous : 1





- Immunization History


Immunization Up to Date: Yes





- Suicide/Smoking/Psychosocial Hx


Smoking Status: No


Smoking History: Never smoked


Have you smoked in the past 12 months: No


Number of Cigarettes Smoked Daily: 0


If you are a former smoker, when did you quit?: 2MONTHS


Information on smoking cessation initiated: No


Hx Alcohol Use: No


Drug/Substance Use Hx: No


Substance Use Type: None





**Review of Systems





- Review of Systems


Able to Perform ROS?: Yes


Is the patient limited English proficient: No


Constitutional: No: Symptoms Reported


HEENTM: No: Symptoms Reported


Respiratory: No: Symptoms reported


Cardiac (ROS): No: Symptoms Reported


ABD/GI: Yes: Symptoms Reported





*Physical Exam





- Vital Signs


 Last Vital Signs











Temp Pulse Resp BP Pulse Ox


 


 97.7 F   72   15   112/67   100 


 


 17 16:44  17 16:44  17 16:44  17 16:44  17 16:44














- Physical Exam


General Appearance: Yes: Nourished, Appropriately Dressed


HEENT: positive: EOMI, FLORIAN


Respiratory/Chest: positive: Lungs Clear, Normal Breath Sounds


Cardiovascular: positive: Regular Rhythm, Regular Rate


Gastrointestinal/Abdominal: positive: Normal Bowel Sounds, Soft.  negative: 

Tender


Integumentary: positive: Normal Color


Neurologic: positive: Fully Oriented, Alert, Normal Mood/Affect, Normal Response

, Motor Strength 





ED Treatment Course





- ADDITIONAL ORDERS


Additional order review: 


 Laboratory  Results











  17





  16:39


 


Urine Color  Ltyellow


 


Urine Appearance  Clear


 


Urine pH  6.0


 


Ur Specific Gravity  1.013


 


Urine Protein  Negative


 


Urine Glucose (UA)  Negative


 


Urine Ketones  Negative


 


Urine Blood  Negative


 


Urine Nitrite  Negative


 


Urine Bilirubin  Negative


 


Urine Urobilinogen  Negative














Medical Decision Making





- Medical Decision Making





17 18:43


cc: vaginal spotting, no abd pain or back pain 


pt here for repeat beta and US


17 20:12


return in 48hrs for follow up beta and US


return sooner if any heavy bleeding, cramping or pain 


17 20:15








US reviewed and the results discussed with  who would like to 

 to see pt in the ER. paged Dr.Khushilnai Borden will see the pt in the ER fast track 





seen by GYN in the ER would like pt to return in 48hrs for repeat beta and US


pt aware of the plan and understands the follow up inst


17 20:26





17 20:55








*DC/Admit/Observation/Transfer


Diagnosis at time of Disposition: 


 Pregnancy test positive, Threatened  in early pregnancy








- Discharge Dispostion


Disposition: HOME


Condition at time of disposition: Good





- Referrals


Referrals: 


Mikaela Regalado MD [Staff Physician] - 





- Patient Instructions


Additional Instructions: 


return in 48hrs for repeat Beta HCG and US


return sooner if any cramping heavy bleeding or any other concerns





- Post Discharge Activity


Forms/Work/School Notes:  Back to Work

## 2017-11-14 NOTE — PDOC
Rapid Medical Evaluation


Chief Complaint: Revisit, Lab Variance


Time Seen by Provider: 11/14/17 16:43


Medical Evaluation: 


 Allergies











Allergy/AdvReac Type Severity Reaction Status Date / Time


 


amoxicillin [Amoxicillin] Allergy   Verified 11/14/17 16:44


 


Penicillins Allergy   Verified 11/14/17 16:44











11/14/17 16:44


I have performed a brief in-person evaluation of this patient. 


The patient presents with a chief complaint of: repeat hcg level and ultrasound


Pertinent physical exam findings:vss, no abd pain


I have ordered the following: beta hcg, ua, ucx, tv u/s


The patient will proceed to the ED for further evaluation.


11/14/17 16:51

## 2017-11-16 ENCOUNTER — HOSPITAL ENCOUNTER (EMERGENCY)
Dept: HOSPITAL 74 - JER | Age: 27
Discharge: HOME | End: 2017-11-16
Payer: COMMERCIAL

## 2017-11-16 VITALS — TEMPERATURE: 97.5 F

## 2017-11-16 VITALS — SYSTOLIC BLOOD PRESSURE: 111 MMHG | DIASTOLIC BLOOD PRESSURE: 78 MMHG | HEART RATE: 67 BPM

## 2017-11-16 VITALS — BODY MASS INDEX: 20.9 KG/M2

## 2017-11-16 DIAGNOSIS — O00.80: Primary | ICD-10-CM

## 2017-11-16 DIAGNOSIS — Z3A.01: ICD-10-CM

## 2017-11-16 LAB
ANION GAP SERPL CALC-SCNC: 11 MMOL/L (ref 8–16)
APPEARANCE UR: CLEAR
BASOPHILS # BLD: 0.5 % (ref 0–2)
BILIRUB UR STRIP.AUTO-MCNC: NEGATIVE MG/DL
CALCIUM SERPL-MCNC: 8.6 MG/DL (ref 8.5–10.1)
CO2 SERPL-SCNC: 23 MMOL/L (ref 21–32)
COLOR UR: (no result)
CREAT SERPL-MCNC: 0.6 MG/DL (ref 0.55–1.02)
DEPRECATED RDW RBC AUTO: 14.6 % (ref 11.6–15.6)
DEPRECATED RDW RBC AUTO: 14.7 % (ref 11.6–15.6)
EOSINOPHIL # BLD: 0.5 % (ref 0–4.5)
GLUCOSE SERPL-MCNC: 95 MG/DL (ref 74–106)
KETONES UR QL STRIP: (no result)
LEUKOCYTE ESTERASE UR QL STRIP.AUTO: (no result)
MCH RBC QN AUTO: 29.9 PG (ref 25.7–33.7)
MCH RBC QN AUTO: 30.4 PG (ref 25.7–33.7)
MCHC RBC AUTO-ENTMCNC: 33.2 G/DL (ref 32–36)
MCHC RBC AUTO-ENTMCNC: 33.3 G/DL (ref 32–36)
MCV RBC: 90 FL (ref 80–96)
MCV RBC: 91.1 FL (ref 80–96)
NEUTROPHILS # BLD: 64.9 % (ref 42.8–82.8)
NITRITE UR QL STRIP: NEGATIVE
PH UR: 6 [PH] (ref 5–8)
PLATELET # BLD AUTO: 277 K/MM3 (ref 134–434)
PLATELET # BLD AUTO: 280 K/MM3 (ref 134–434)
PMV BLD: 8.2 FL (ref 7.5–11.1)
PMV BLD: 8.4 FL (ref 7.5–11.1)
PROT UR QL STRIP: NEGATIVE
PROT UR QL STRIP: NEGATIVE
RBC # UR STRIP: (no result) /UL
RBC #/AREA URNS HPF: 1 /[HPF]
SP GR UR: 1.01 (ref 1–1.03)
UROBILINOGEN UR STRIP-MCNC: NEGATIVE MG/DL (ref 0.2–1)
WBC # BLD AUTO: 7.9 K/MM3 (ref 4–10)
WBC # BLD AUTO: 8.6 K/MM3 (ref 4–10)
WBC #/AREA URNS HPF: 1 /[HPF]

## 2017-11-16 PROCEDURE — 3E023GC INTRODUCTION OF OTHER THERAPEUTIC SUBSTANCE INTO MUSCLE, PERCUTANEOUS APPROACH: ICD-10-PCS

## 2017-11-16 NOTE — PDOC
Attending Attestation





- HPI


HPI: 





17 17:55


27 year old female , with no significant past medical history, who presents 

to the emergency room requesting blood work and was concerned she had an 

ectopic pregnancy. She was recently seen in the ED on  and  and had 

ultrasounds that revealed a thickened endometrial stripe without evidence of 

intrauterine pregnancy. 





- Physicial Exam


PE: 





17 17:55


Constitutional: Awake, alert, oriented.  No acute distress.


Head:  Normocephalic.  Atraumatic


Eyes:  PERRL. EOMI.  Conjunctivae are not pale.


ENT:  Mucous membranes are moist and intact. Posterior pharynx without exudates 

or erythema. Uvula midline.


Cardiovascular:  Regular rate.  Regular rhythm. S1, S2 regular.  Distal pulses 

are 2+ and symmetric.  


Pulmonary/Chest:  No evidence of respiratory distress.  Clear to auscultation 

bilaterally  No wheezing, rales or rhonchi.


Abdominal:  Soft and non-distended.  There is no tenderness.  No rebound, 

guarding or rigidity.  No organomegaly. No palpable masses. Good bowel sounds.


Back:  No CVA tenderness.


Musculoskeletal:  No edema.  No cyanosis.  No clubbing.  Full range of motion 

in all extremities.  Nocalf tenderness. Radial/pedal pulses are intact and 2+ 

bilaterally 


Skin:  Skin is warm and dry.  No petechiae.  No purpura.  


Neurological:  Alert and oriented to person, place, and time.  Cranial nerves II

-XII are grossly intact. Normal speech. Strength is grossly symmetric. No 

sensory deficits. 


Psychiatric:  Good eye contact.  Normal interaction, affect and behavior. 








<Mary Alice Boyer - Last Filed: 17 17:55>





- Resident


Resident Name: Destinee Serna





- ED Attending Attestation


I have performed the following: I have examined & evaluated the patient, The 

case was reviewed & discussed with the resident, I agree w/resident's findings 

& plan, Exceptions are as noted





- Medical Decision Making





17 17:04








I, Dr. Estephania Lindquist, DO, attest that this document has been prepared under 

my direction and personally reviewed by me in its entirety.   I further attest, 

that it accurately reflects all work, treatment, procedures and medical decision

-making performed by me.  


11/16/17 21:22


a/p: 26yo female with poss ectopic vs threatened ab


repeat labs, tv us


will discuss results with OB/GYN


has appt with OB/GYN for tomorrow


17 21:22


increasing beta, ultrasound shows R adnexal mass


case discussed with OB - Dr. Woodson - who recommends methotrexate for ectopic 

preg


pt understands risks/benefits of methotrexate - was seen and discussed these 

options 2 days ago with OB





17 21:23


pt ambulatory. given precautions on why to return to the ED and reasons for 

follow up with OB/GYN.





<Estephania Lindquist - Last Filed: 17 21:24>

## 2017-11-16 NOTE — PDOC
Rapid Medical Evaluation


Time Seen by Provider: 11/16/17 16:07


Medical Evaluation: 


 Allergies











Allergy/AdvReac Type Severity Reaction Status Date / Time


 


amoxicillin [Amoxicillin] Allergy   Verified 11/16/17 16:08


 


Penicillins Allergy   Verified 11/16/17 16:08











11/16/17 16:10


I have performed a brief in-person evaluation of this patient.





The patient presents with a chief complaint of: "I need blood work."





Pertinent physical exam findings:


GEN: NAD





I have ordered the following:


CBC, BMP, Beta HCG, TVUS





The patient will proceed to the ED for further evaluation.





**Discharge Disposition





- Diagnosis


Pregnancy


Qualifiers:


 Weeks of gestation: less than 8 weeks Qualified Code(s): Z3A.01 - Less than 8 

weeks gestation of pregnancy








- Referrals





- Patient Instructions





- Post Discharge Activity

## 2017-11-16 NOTE — PDOC
History of Present Illness





- General


Chief Complaint: Vaginal Bleeding


Stated Complaint: REVISIT/BLOODWORK


Time Seen by Provider: 17 16:07





- History of Present Illness


Initial Comments: 





17 17:16


Patient is a 27 y.o.  who presents for concern of ectopic pregnancy.  

Patient has been evaluated at our facility on two prior occasions  (Beta 

)  and on  (Betac HCG 1896).  On both evaluations TVUS showed no 

IUP with no endometrial stripe.  Patient presents today as previously counseled 

for evaluation of her Beta HCG.  Patient has a previously scheduled OB Gyn 

appointment for tomorrow (10/17) which will be her first OB evaluation during 

this pregnancy.





Past History





- Past Medical History


Allergies/Adverse Reactions: 


 Allergies











Allergy/AdvReac Type Severity Reaction Status Date / Time


 


amoxicillin [Amoxicillin] Allergy   Verified 17 18:34


 


Penicillins Allergy   Verified 17 18:34











Home Medications: 


Ambulatory Orders





NK [No Known Home Medication]  17 








Cardiac Disorders: Yes (Syncope with menstruation)


COPD: No


DVT: No





- Surgical History


Appendectomy: Yes





- Reproductive History


 (#): 2


Para: 0


Spontaneous : 1





- Immunization History


Immunization Up to Date: Yes





- Suicide/Smoking/Psychosocial Hx


Smoking Status: No


Smoking History: Former smoker


Have you smoked in the past 12 months: No


Number of Cigarettes Smoked Daily: 0


If you are a former smoker, when did you quit?: 2MONTHS


Information on smoking cessation initiated: No


Hx Alcohol Use: No


Drug/Substance Use Hx: No


Substance Use Type: None





**Review of Systems





- Review of Systems


Constitutional: No: Chills, Fever


Respiratory: No: Shortness of Breath


Cardiac (ROS): No: Chest Pain


ABD/GI: Yes: Constipated.  No: Diarrhea, Nausea, Vomiting


: Yes: See HPI


All Other Systems: Reviewed and Negative





*Physical Exam





- Vital Signs


 Last Vital Signs











Temp Pulse Resp BP Pulse Ox


 


 97.5 F L  93 H  19   118/76   99 


 


 17 16:08  17 16:08  17 16:08  17 16:08  17 16:08














- Physical Exam


General Appearance: Yes: Nourished, Appropriately Dressed


Respiratory/Chest: positive: Lungs Clear, Normal Breath Sounds.  negative: 

Labored Respiration, Rapid RR


Cardiovascular: positive: S1, S2


Female Pelvic Exam: positive: normal external exam, cervical os closed, vaginal 

bleeding.  negative: discharge


Extremity: positive: Normal Capillary Refill


Integumentary: positive: Normal Color, Dry, Warm


Neurologic: positive: Fully Oriented, Alert





ED Treatment Course





- LABORATORY


CBC & Chemistry Diagram: 


 17 17:25





 17 16:15





- ADDITIONAL ORDERS


Additional order review: 


 











  17





  16:15


 


RBC  4.16


 


MCV  90.0


 


MCHC  33.2


 


RDW  14.7


 


MPV  8.4


 


Neutrophils %  64.9


 


Lymphocytes %  28.3  D


 


Monocytes %  5.8


 


Eosinophils %  0.5


 


Basophils %  0.5














Medical Decision Making





- Medical Decision Making





17 10:08


Patient is a 27 y.o.  female who presents with concern of ectopic 

pregnancy.  Beta HCG today 2312, increased from prior visits.  Case d/w Dr. Santizo (OB Gyn) who suggests methotrextrate with close follow-up.  Patient 

given OTD of Methotrexate counseled extensively on importance of OB Gyn follow 

up for repeat Beta HCG given return precautions and discharged home.   











*DC/Admit/Observation/Transfer


Diagnosis at time of Disposition: 


 Ectopic pregnancy








- Discharge Dispostion


Disposition: HOME


Condition at time of disposition: Fair


Admit: No





- Referrals


Referrals: 


Frederic Brody MD [Staff Physician] - 





- Patient Instructions


Additional Instructions: 


Please keep your previously scheduled appointment with your OB Gyn tomorrow.  

Please take the copies of your labs and ultrasound.  You must have a repeat B-

HCG in 3 days.  If you can't see your OB Gyn then please make an appointment 

with Dr. Santizo (contact information included).  It is imperative you are seen 

in three days time for a repeat B-HCG.  Please return to the Emergency 

Department for any worsening or concerning symptoms. 





- Post Discharge Activity


Forms/Work/School Notes:  Back to Work

## 2017-11-24 ENCOUNTER — HOSPITAL ENCOUNTER (INPATIENT)
Dept: HOSPITAL 74 - JER | Age: 27
LOS: 1 days | Discharge: LEFT BEFORE DISCHARGE | DRG: 566 | End: 2017-11-25
Attending: OBSTETRICS & GYNECOLOGY | Admitting: OBSTETRICS & GYNECOLOGY
Payer: COMMERCIAL

## 2017-11-24 VITALS — BODY MASS INDEX: 20.9 KG/M2

## 2017-11-24 DIAGNOSIS — R10.2: ICD-10-CM

## 2017-11-24 DIAGNOSIS — O00.101: Primary | ICD-10-CM

## 2017-11-24 LAB
BASOPHILS # BLD: 0.4 % (ref 0–2)
DEPRECATED RDW RBC AUTO: 14.5 % (ref 11.6–15.6)
EOSINOPHIL # BLD: 0.9 % (ref 0–4.5)
MCH RBC QN AUTO: 30.9 PG (ref 25.7–33.7)
MCHC RBC AUTO-ENTMCNC: 33.7 G/DL (ref 32–36)
MCV RBC: 91.5 FL (ref 80–96)
NEUTROPHILS # BLD: 64.9 % (ref 42.8–82.8)
PLATELET # BLD AUTO: 245 K/MM3 (ref 134–434)
PMV BLD: 8.9 FL (ref 7.5–11.1)
WBC # BLD AUTO: 10.8 K/MM3 (ref 4–10)

## 2017-11-24 NOTE — PDOC
Attending Attestation





- Resident


Resident Name: Florentin Lamson





- ED Attending Attestation


I have performed the following: I have examined & evaluated the patient, The 

case was reviewed & discussed with the resident, I agree w/resident's findings 

& plan, Exceptions are as noted





- HPI


HPI: 





11/24/17 23:40


27y F hx of diagnosed ectopic s/p methotrexate (visualized on US), presents 

with lower abdminal pain, vaginal bleeding, started since this morning. 


abd exam noted for mild rlq tenderness


no rebound/gurading


pt appears in no distress otherwise





beta increasing to 2800 from 11/16 - concerning


concerned for ruptured ectopic





will obtain US and d/w gyn











- Physicial Exam


PE: 





11/25/17 03:37


see above





- Medical Decision Making





11/25/17 01:27


case dw dr. dia


agrees pt will need to be admitted for failed outpatient mangement of ectopic


waiting formal US results


will admit to dr. mayberry service





Case discussed in detail with admitting physician including history, physical 

exam and ancillary studies.


Admitting physician has assumed care for the patient, will follow all pending 

diagnostics and will complete the evaluation and treatment. 








11/25/17 02:13


us consistent with tubal ectopic 


small amt of free fluid

## 2017-11-24 NOTE — PDOC
Rapid Medical Evaluation


Time Seen by Provider: 11/24/17 21:30


Medical Evaluation: 


 Allergies











Allergy/AdvReac Type Severity Reaction Status Date / Time


 


amoxicillin [Amoxicillin] Allergy   Verified 11/24/17 21:29


 


Penicillins Allergy   Verified 11/24/17 21:29











11/24/17 21:30


I have performed a brief in-person evaluation of this patient.





The patient presents with a chief complaint of:  pelvic pain 


***dx with ectopic preg on 11/16/2017 beta was 2312.5 11/16/2017***


**was give methotrexate on 11/16/2017***





Pertinent physical exam findings: pelvic pain on 





I have ordered the following; beta quant


                 


The patient will proceed to the ED for further evaluation:

## 2017-11-24 NOTE — PDOC
History of Present Illness





<Jaziel Cole - Last Filed: 17 01:29>





- History of Present Illness


Initial Comments: 





17 22:26


26 yo  F with h/o appendectomy and recent ectopic confirmed on U/S s/p 

Methotrexate ( ) who presents with pelvic/abdominal pain. Patient reports 

onset of stable, crampy, severe, sharp, shooting lower abdominal and pelvic 

pain this AM. Pain worse with movement and slightly improved with supine 

positioning. Endorses vaginal bleeding beginning ()Visualized clotting in 

toilet today. Endorses mild lightheadedness. Denies fevers/chills, diarrhea, 

constipation, BRBPR hematuria, chest pain, SOB. LMP 10/11/17. Denies h/o STI's, 

and currently sexually active with one sexual partner. Dr. [facility]  

[facility]. Last Interval Transvaginal U/S () reveals presence of tubal 

ectopic with increased size in comparison to previous U/S (  and ) 











<Lev Lam - Last Filed: 17 17:56>





- General


Chief Complaint: Pain, Acute


Stated Complaint: PAIN


Time Seen by Provider: 17 21:30





Past History





<Jaziel Cole - Last Filed: 17 01:29>





- Past Medical History


Cardiac Disorders: Yes (Syncope with menstruation)


COPD: No


DVT: No





- Surgical History


Appendectomy: Yes





- Reproductive History


 (#): 2


Para: 0


Spontaneous : 1





- Immunization History


Immunization Up to Date: Yes





- Suicide/Smoking/Psychosocial Hx


Smoking Status: No


Smoking History: Never smoked


Have you smoked in the past 12 months: No


Number of Cigarettes Smoked Daily: 0


If you are a former smoker, when did you quit?: 2MONTHS


Hx Alcohol Use: No


Drug/Substance Use Hx: No


Substance Use Type: None





<Lev Lam - Last Filed: 17 17:56>





- Past Medical History


Allergies/Adverse Reactions: 


 Allergies











Allergy/AdvReac Type Severity Reaction Status Date / Time


 


amoxicillin [Amoxicillin] Allergy   Verified 17 21:29


 


Penicillins Allergy   Verified 17 21:29











Home Medications: 


Ambulatory Orders





NK [No Known Home Medication]  17 











**Review of Systems





- Review of Systems


Comments:: 





17 22:36


GENERAL/CONSTITUTIONAL: No fever or chills. No weakness.


HEAD, EYES, EARS, NOSE AND THROAT: No change in vision. No ear pain or 

discharge. No sore throat.-


CARDIOVASCULAR: No chest pain or shortness of breath


RESPIRATORY: No cough, wheezing, or hemoptysis.


GASTROINTESTINAL: No nausea, vomiting, diarrhea or constipation.


GENITOURINARY: + abdominal and pelvic pain. No dysuria, frequency, or change in 

urination.


MUSCULOSKELETAL: No joint or muscle swelling or pain. No neck or back pain.


SKIN: No rash


NEUROLOGIC: No headache, vertigo, loss of consciousness, or change in strength/

sensation.


ENDOCRINE: No increased thirst. No abnormal weight change


HEMATOLOGIC/LYMPHATIC: No anemia, easy bleeding, or history of blood clots.


ALLERGIC/IMMUNOLOGIC: No hives or skin allergy.








<Lev Lam - Last Filed: 17 17:56>





*Physical Exam





- Vital Signs


 Last Vital Signs











Temp Pulse Resp BP Pulse Ox


 


 97.7 F   81   18   102/70   100 


 


 17 21:30  17 21:30  17 21:30  17 21:30  17 21:30














<Jaziel Cole - Last Filed: 17 01:29>





- Vital Signs


 Last Vital Signs











Temp Pulse Resp BP Pulse Ox


 


 97.7 F   81   18   102/70   100 


 


 17 21:30  17 21:30  17 21:30  17 21:30  17 21:30














- Physical Exam


Comments: 





17 22:38





GENERAL: Awake, alert, and fully oriented, in no acute distress


HEAD: No signs of trauma, normocephalic, atraumatic 


EYES: PERRLA, EOMI, sclera anicteric, conjunctiva clear


ENT: Hearing grossly normal, nares patent, oropharynx clear without


exudates. Moist mucosa


NECK: Normal ROM, no JVD, or masses


LUNGS: No distress, speaks full sentences, clear to auscultation bilaterally 


HEART: Regular rate and rhythm, normal S1 and S2, no murmurs, rubs or gallops, 

peripheral pulses normal and equal bilaterally. 


ABDOMEN: Soft, + lower abdominal ttp, normoactive bowel sounds.  No guarding, 

no rigidity, no rebound.  No masses. No CVA ttp, and suprapubic ttp. 


Pelvic: Pelvic exam with active clotting and bleeding in vagina.cervical os 

appears to be closed. No cervical motion tenderness on bimanual exam. 


EXTREMITIES : Normal inspection, Normal range of motion, no edema.  No clubbing 

or cyanosis. 


SKIN: Warm, Dry, normal turgor, no rashes or lesions noted. 











<Lev Lam - Last Filed: 17 17:56>





ED Treatment Course





- LABORATORY


CBC & Chemistry Diagram: 


 17 23:00





 17 23:00





- ADDITIONAL ORDERS


Additional order review: 


 Laboratory  Results











  17





  23:00 23:00 23:00


 


WBC    10.8 H D


 


RBC    4.16


 


Hgb    12.8


 


Hct    38.1


 


MCV    91.5


 


MCH    30.9


 


MCHC    33.7


 


RDW    14.5


 


Plt Count    245


 


MPV    8.9


 


Neutrophils %    64.9


 


Lymphocytes %    28.3


 


Monocytes %    5.5


 


Eosinophils %    0.9


 


Basophils %    0.4


 


Sodium   139 


 


Potassium   3.6 


 


Chloride   105 


 


Carbon Dioxide   26 


 


Anion Gap   8 


 


BUN   10  D 


 


Creatinine   0.7 


 


Creat Clearance w eGFR   > 60 


 


Random Glucose   90 


 


Calcium   8.6 


 


Total Bilirubin   0.4 


 


AST   7 L 


 


ALT   17 


 


Alkaline Phosphatase   47 


 


Total Protein   7.7 


 


Albumin   4.2 


 


Beta HCG, Quant   


 


Urine Color   


 


Urine Appearance   


 


Urine pH   


 


Ur Specific Gravity   


 


Urine Protein   


 


Urine Glucose (UA)   


 


Urine Ketones   


 


Urine Blood   


 


Urine Nitrite   


 


Urine Bilirubin   


 


Urine Urobilinogen   


 


Blood Type  O POSITIVE  


 


Antibody Screen  Negative  














  17





  22:59 21:35


 


WBC  


 


RBC  


 


Hgb  


 


Hct  


 


MCV  


 


MCH  


 


MCHC  


 


RDW  


 


Plt Count  


 


MPV  


 


Neutrophils %  


 


Lymphocytes %  


 


Monocytes %  


 


Eosinophils %  


 


Basophils %  


 


Sodium  


 


Potassium  


 


Chloride  


 


Carbon Dioxide  


 


Anion Gap  


 


BUN  


 


Creatinine  


 


Creat Clearance w eGFR  


 


Random Glucose  


 


Calcium  


 


Total Bilirubin  


 


AST  


 


ALT  


 


Alkaline Phosphatase  


 


Total Protein  


 


Albumin  


 


Beta HCG, Quant   2787.6


 


Urine Color  Yellow 


 


Urine Appearance  Slcloudy 


 


Urine pH  5.0 


 


Ur Specific Gravity  1.030 


 


Urine Protein  1+ H 


 


Urine Glucose (UA)  Negative 


 


Urine Ketones  2+ H 


 


Urine Blood  3+ H 


 


Urine Nitrite  Negative 


 


Urine Bilirubin  Negative 


 


Urine Urobilinogen  2.0 H 


 


Blood Type  


 


Antibody Screen  








 











  17





  23:00


 


RBC  4.16


 


MCV  91.5


 


MCHC  33.7


 


RDW  14.5


 


MPV  8.9


 


Neutrophils %  64.9


 


Lymphocytes %  28.3


 


Monocytes %  5.5


 


Eosinophils %  0.9


 


Basophils %  0.4














- Medications


Given in the ED: 


ED Medications














Discontinued Medications














Generic Name Dose Route Start Last Admin





  Trade Name Yoandy  PRN Reason Stop Dose Admin


 


Sodium Chloride  1,000 mls @ 1,000 mls/hr  17 22:31  17 23:01





  Normal Saline -  IV  17 23:30  1,000 mls/hr





  ASDIR STA   Administration


 


Ondansetron HCl  4 mg  17 22:31  17 23:02





  Zofran -  PO  17 22:32  4 mg





  ONCE ONE   Administration














<Jaziel Cole - Last Filed: 17 01:29>





- LABORATORY


CBC & Chemistry Diagram: 


 17 23:00





 17 23:00





<Lev Lam - Last Filed: 17 17:56>





Medical Decision Making





- Medical Decision Making





17 22:39


26 yo  F with h/o appendectomy and recent ectopic 6w2d  confirmed on U/S 

s/p Methotrexate () who presents with stable, crampy, severe, sharp, 

shooting lower abdominal and pelvic pain this AM. Pain worse with movement and 

slightly improved with supine positioning. Endorses vaginal bleeding beginning (

) with visualized clotting in toilet this AM. Endorses mild 

lightheadedness. Denies fevers/chills, diarrhea, constipation, BRBPR hematuria, 

chest pain, SOB. LMP 10/11/17. Physical exam reveals diffuse lower abdominal ttp

, with /60. Pelvic exam with active vaginal bleeding and clotting 

visualized through cervix. Cervical os appears closed. Last Interval 

Transvaginal U/S () reveals presence of tubal ectopic with increased size 

in comparison to previous U/S (  and ). Will order basic labs and 

BHCG to assess state of ectopic pregnancy.  





ED Course: 


CBC, CMP, Lipase, UA, BHCG


NS 1 L, Zofran 





17 23:36


HC





17 17:55


U/S with tubal ectopic. Admitted to Dr. Santizo. 





<Lev Lam - Last Filed: 17 17:56>





*DC/Admit/Observation/Transfer





- Discharge Dispostion


Admit: Yes





<Jaziel Cole - Last Filed: 17 01:29>





<Lev Lam - Last Filed: 17 17:56>


Diagnosis at time of Disposition: 


Ectopic pregnancy


Qualifiers:


 Location of ectopic pregnancy: tubal Intrauterine pregnancy status: without 

intrauterine pregnancy Laterality: right Qualified Code(s): O00.101 - Right 

tubal pregnancy without intrauterine pregnancy








- Discharge Dispostion


Disposition: AGAINST MEDICAL ADVICE


Condition at time of disposition: Stable

## 2017-11-25 VITALS — DIASTOLIC BLOOD PRESSURE: 51 MMHG | SYSTOLIC BLOOD PRESSURE: 119 MMHG | TEMPERATURE: 97.9 F | HEART RATE: 69 BPM

## 2017-11-25 LAB
ALBUMIN SERPL-MCNC: 4.2 G/DL (ref 3.4–5)
ALP SERPL-CCNC: 47 U/L (ref 45–117)
ALT SERPL-CCNC: 17 U/L (ref 12–78)
ANION GAP SERPL CALC-SCNC: 8 MMOL/L (ref 8–16)
APPEARANCE UR: (no result)
AST SERPL-CCNC: 7 U/L (ref 15–37)
BILIRUB SERPL-MCNC: 0.4 MG/DL (ref 0.2–1)
BILIRUB UR STRIP.AUTO-MCNC: NEGATIVE MG/DL
CALCIUM SERPL-MCNC: 8.6 MG/DL (ref 8.5–10.1)
CO2 SERPL-SCNC: 26 MMOL/L (ref 21–32)
COLOR UR: YELLOW
CREAT SERPL-MCNC: 0.7 MG/DL (ref 0.55–1.02)
GLUCOSE SERPL-MCNC: 90 MG/DL (ref 74–106)
HYALINE CASTS URNS QL MICRO: 5 /LPF
KETONES UR QL STRIP: (no result)
MUCOUS THREADS URNS QL MICRO: (no result)
NITRITE UR QL STRIP: NEGATIVE
PH UR: 5 [PH] (ref 5–8)
PROT SERPL-MCNC: 7.7 G/DL (ref 6.4–8.2)
PROT UR QL STRIP: (no result)
PROT UR QL STRIP: NEGATIVE
RBC # UR STRIP: (no result) /UL
RBC #/AREA URNS HPF: 787 /HPF (ref 0–3)
SP GR UR: 1.03 (ref 1–1.03)
UROBILINOGEN UR STRIP-MCNC: 2 MG/DL (ref 0.2–1)
WBC #/AREA URNS HPF: 14 /HPF (ref 3–5)

## 2017-11-26 NOTE — HP
Past Medical History





- Primary Care Physician


PCP:: Frederic Brody (History and physical done on 17 10 am)





- Admission


Chief Complaint: pelvic pain, vaginal bleeding.  r/o RT tubal ectopic


History of Present Illness: 





26 yo f  , 4TH visit to er for ft tubal ectopic , txed with methtrexate 

17 , with no decline in HCG , today return with low abdominal pain, 

crampy pain and vaginal bleed, admitted for laparoscopy. possible salpingostomy

, possible salpingectomy, ulternatives discussed , re treatment with 

methatrexate discussed , but in view it did not work previous;ly and 

symptomatic with pain was not advised


History Source: Patient


Limitations to Obtaining History: No Limitations





- Past Medical History


...Induced : 1


...Multiple Gestation: 1





- Past Surgical History


Past Surgical History: Yes: None


Hx Myomectomy: No


Hx Transabdominal Cerclage: No





- Smoking History


Smoking history: Never smoked


Have you smoked in the past 12 months: No


Aproximately how many cigarettes per day: 0


If you are a former smoker, when did you quit?: 2MONTHS





- Alcohol/Substance Use


Hx Alcohol Use: No


History of Substance Use: reports: None





- Social History


Usual Living Arrangement: Yes: With Spouse





Home Medications





- Allergies


Allergies/Adverse Reactions: 


 Allergies











Allergy/AdvReac Type Severity Reaction Status Date / Time


 


amoxicillin [Amoxicillin] Allergy   Verified 17 21:29


 


Penicillins Allergy   Verified 17 21:29














- Home Medications


Home Medications: 


Ambulatory Orders





NK [No Known Home Medication]  17 











Review of Systems





- Review of Systems


Constitutional: reports: No Symptoms


Eyes: reports: No Symptoms


HENT: reports: No Symptoms


Neck: reports: No Symptoms


Cardiovascular: reports: No Symptoms


Respiratory: reports: No Symptoms


Gastrointestinal: reports: Abdominal Pain


Genitourinary: reports: Vaginal Bleeding


Breasts: reports: No Symptoms Reported


Musculoskeletal: reports: No Symptoms


Integumentary: reports: No Symptoms


Neurological: reports: No Symptoms


Endocrine: reports: No Symptoms


Hematology/Lymphatic: reports: No Symptoms


Psychiatric: reports: No Symptoms





Physical Exam-GYN


Vital Signs: 


 Vital Signs











Temperature  97.9 F   17 09:46


 


Pulse Rate  69   17 09:46


 


Respiratory Rate  18   17 09:46


 


Blood Pressure  119/51   17 09:46


 


O2 Sat by Pulse Oximetry (%)  100   17 03:00








pe was done by ER physician


Constitutional: Yes: Well Nourished


Eyes: Yes: WNL


HENT: Yes: WNL


Neck: Yes: WNL


Cardiovascular: Yes: WNL


Respiratory: Yes: WNL


Gastrointestinal: Yes: WNL (tender suprapubic area)


Edema: No


Labs: 


 CBC, BMP





 17 23:00 





 17 23:00 











Problem List





- Problem


(1) Right tubal pregnancy


Code(s): O00.101 - RIGHT TUBAL PREGNANCY WITHOUT INTRAUTERINE PREGNANCY   


Qualifiers: 


   Intrauterine pregnancy status: without intrauterine pregnancy   Qualified 

Code(s): O00.101 - Right tubal pregnancy without intrauterine pregnancy   





Assessment/Plan





advised laparoscopy, salpingostomy, possible salpingectomy, risks discussed, 

risks of infection, bleeding, injury to surrounding tissue , infertility, 

anesthesia risks and post op complication discussed

## 2018-05-04 NOTE — CON.OBG
Consult


Consult Specialty:: ob /gyn 


Referred by:: Carlos Moore 


Reason for Consultation:: r/o ectopic pregn





- History of Present Illness


Chief Complaint: 27 yrs , Lmp 10/11/17, 4.6 weeks gestation c/o vaginal 

bleeding.  sonogram done 17 thickened EM, no evidence of IUP, .In Rt 

adnexa echogenic density 1.6cm adjacent to rt ovary seen, suspicious of ectopic 

.


History of Present Illness: 


Pt was seen in the ER to confirm pregn  on 17 .


BHCG on 17 was 906.3. .  H/H was 12.6/37.6, Chemprofile wnl 


no c/o pain , or dizziness or fainting spells 





- History Source


History Provided By: Patient


Limitations to Obtaining History: No Limitations





- Past Medical History


Reproductive: Yes: Other (Past MH : 28-30 days cycle , regular, sometimes pain 

during period , once a year fainting spell. )


...LMP: 10/18/17


...Pregnant: Yes


...: 2


...Para: 0 (h/o sp ab 7 weeks 2017 )


Infectious Disease: No: STD's





- Past Surgical History


Past Surgical History: Yes: None





- Alcohol/Substance Use


Hx Alcohol Use: No


History of Substance Use: reports: None





- Smoking History


Smoking history: Never smoked


Have you smoked in the past 12 months: No


Aproximately how many cigarettes per day: 0


If you are a former smoker, when did you quit?: 2MONTHS





Home Medications





- Allergies


Allergies/Adverse Reactions: 


 Allergies











Allergy/AdvReac Type Severity Reaction Status Date / Time


 


amoxicillin [Amoxicillin] Allergy   Verified 17 16:44


 


Penicillins Allergy   Verified 17 16:44














- Home Medications


Home Medications: 


Ambulatory Orders





NK [No Known Home Medication]  17 











Physical Exam-GYN


Vital Signs: 


 Vital Signs











Temperature  97.7 F   17 16:44


 


Pulse Rate  72   17 16:44


 


Respiratory Rate  15   17 16:44


 


Blood Pressure  112/67   17 16:44


 


O2 Sat by Pulse Oximetry (%)  100   17 16:44











Constitutional: Yes: Well Nourished, No Distress, Calm


Gastrointestinal: Yes: WNL, Normal Bowel Sounds, Soft.  No: Tenderness, 

Tenderness, Epigastrium, Tenderness, Rebound, Vomiting


Renal/: Yes: Pregnant


External Genitalia: Yes: Normal, Bleeding


Internal Exam Deferred: Yes


Vaginal Exam: Yes: Bleeding (light bleeding fresh , not dark color)


Cervix: Yes: Bleeding (minimal through os).  No: Cerv Motion Tenderness


Uterus: Yes: Normal, Anteverted, Firm.  No: Tender


Adnexa: Normal: Bilateral, Not Palpable: Bilateral (not tender )


Edema: No


Labs: 





 Laboratory Tests











  17





  18:23 18:23 16:39


 


Hgb  12.6  


 


Hct  37.6  


 


Plt Count  274  


 


Sodium   139 


 


Potassium   3.8 


 


Chloride   106 


 


BUN   9 


 


Creatinine   0.5 L D 


 


Random Glucose   87 


 


AST   8 L 


 


ALT   15 


 


Beta HCG, Quant   906.3  1896.9














Problem List





- Problems


(1) Threatened miscarriage in early pregnancy


Code(s): O20.0 - THREATENED    





Assessment/Plan


27 yrs , 5.6 weeks gestation, hcg is doubled in 2 days [id number] to 1896 

miu 


sono no iup noted , suspicious of ectopic .


I discussed with patient, since normal doubling of harmone level has ocurred , 

even when us not in favor of IUP, one can wait since she is asymptomatic except 

bleeding vaginally. That will be expectant management to repeat BHCG in 48 hrs 

, with awareness of danger of ectopic pregn , possible rupture which can be 

dangerous .


Another course will be to treat as Ectopic pregn since no iup seen , adnexal 

echogenic density is seen adjacent to Rt Ovary . 


rx Inj Methotrexate , it can avoid surgical intervention 


R/B/A of both courses told, pt prefers to wait give herself chance , will 

return to ER for BHCG f/u 


she is alerted to expect bleeding , pain cramps , sudden sarp pain , fainting 

spell return  to ER  immediately
(0) independent

## 2018-05-16 ENCOUNTER — HOSPITAL ENCOUNTER (EMERGENCY)
Dept: HOSPITAL 74 - FER | Age: 28
Discharge: HOME | End: 2018-05-16
Payer: COMMERCIAL

## 2018-05-16 VITALS — SYSTOLIC BLOOD PRESSURE: 109 MMHG | DIASTOLIC BLOOD PRESSURE: 78 MMHG | HEART RATE: 74 BPM

## 2018-05-16 VITALS — TEMPERATURE: 98.7 F

## 2018-05-16 VITALS — BODY MASS INDEX: 20.9 KG/M2

## 2018-05-16 DIAGNOSIS — G43.909: ICD-10-CM

## 2018-05-16 DIAGNOSIS — N94.6: Primary | ICD-10-CM

## 2018-05-16 PROCEDURE — 3E033GC INTRODUCTION OF OTHER THERAPEUTIC SUBSTANCE INTO PERIPHERAL VEIN, PERCUTANEOUS APPROACH: ICD-10-PCS

## 2018-05-16 PROCEDURE — 3E0337Z INTRODUCTION OF ELECTROLYTIC AND WATER BALANCE SUBSTANCE INTO PERIPHERAL VEIN, PERCUTANEOUS APPROACH: ICD-10-PCS

## 2018-05-16 NOTE — PDOC
History of Present Illness





- General


History Source: Patient


Exam Limitations: No Limitations





- History of Present Illness


Initial Comments: 





18 21:19


The patient is a 27 year old female with a significant PMH of ectopic pregnancy 

and migraines who presents to the emergency department with an acute migraine. 

The patient reports that she has gotten migraines like this in the past . she 

states that her migraine starts on the left front side of her head and radiates 

to the front center. She states. The patient reports that she fainted prior to 

arrival to ED secondary to severe menstrual cramps. She states that she started 

her menstrual cycle on . She reports associated back pain with her 

cramps. The patient states that she has been experiencing a sore throat as 

well. The patient denies any other symptoms. She denies any fever, chills, 

nausea, vomit, diarrhea ,constipation or urinary symptoms..She denies chest pain

, shortness of breath, headache and dizziness. The patient denies any other 

complaints


 


 


PAST MEDICAL HISTORY:  migraines, ectopic pregnancy





PAST SURGICAL HISTORY:  no significant history





FAMILY HISTORY:  no pertinent history





SOCIAL HISTORY:  Pt lives with  family and is employed.





MEDICATIONS:  reviewed





ALLERGIES:  As per nursing notes





General:  No fevers or chills, no weakness, no weight loss 


HEENT: (+)sore throat,No change in vision. No ear pain


CardioVascular:  No chest pain or shortness of breath


Respiratory:No cough, or wheezing. 


Gastrointestinal: (+)menstrual pain no nausea, vomiting, diarrhea or 

constipation,  No rectal bleeding


Genitourinary:  No dysuria, hematuria, or frequency


Musculoskeletal:  No joint or muscle pain or swelling


Neurologic: (+)migraine headache .No vertigo, dizziness or loss of consciousness


Psychiatric: nor depression 


Skin: No rashes or easy bruising


Endocrine: no increased thirst or abnormal weight change


Allergic: no skin or latex allergy


All other systems reviewed and normal








General: Well-nourished well-developed individual, no acute distress


HEENT: Throat: Normal, tonsils normal, no erythema or exudate


Neck: Supple, no meningeal signs, no lymphadenopathy


Eyes::Pupils equal reactive and round, extraocular motion intact


Chest: Nontender to palpation 


Cardiac: S1-S2 normal, regular rate and rhythm, no murmurs rubs or gallops


Respiratory: Lungs clear to auscultation bilateral


Abdomen: (+)tenderness across lower abdomen, slightly increased  bowel sounds. 

Soft, nondistended, 


Extremities: Warm, dry, no cyanosis, clubbing, or edema


Skin: No rashes


Neuro: Alert and oriented x3, nonfocal exam, grossly intact, normal gait


Psych: Normal mood and affect








<Mathew Russell - Last Filed: 18 21:19>





- General


History Source: Patient


Exam Limitations: No Limitations





- History of Present Illness


Initial Comments: 





 


A portion of this note was documented by scribe services under my direction. I 

have reviewed the details of the note, within reason, and agree with the 

documentation.  The case summary and management plan written by me. 








18 21:21





Assessment and plan: This is a 27-year-old female who comes in complaining of 

menstrual cramps pain. Patient had a near syncope events secondary to the pain. 

Patient has history of vasovagal syncope and near syncope event with severe 

menstrual cramps in the past. Patient is also complaining of her typical 

migraine. Patient said it is left-sided and throbbing associated with some 

nausea but no vomiting or photophobia.


Patient has not taken anything for the migraine or the menstrual cramps.





Medical decision making


We will hydrate patient and give her some Reglan and Toradol for the migraine 

cramps.


Will rule out ectopic pregnancy and pregnancy with a beta hCG





Well reassess





21:30


Patient feels much better post a liter of fluid and some Reglan





Patient discharged home will follow-up with her OB and primary care doctor.








<Larry Palacios - Last Filed: 18 21:44>





- General


Chief Complaint: Migraine Headache


Stated Complaint: MIGRAINEHEADACHE


Time Seen by Provider: 18 20:19





Past History





<Mathew Russell - Last Filed: 18 21:19>





- Past Medical History


Cardiac Disorders: Yes (Syncope with menstruation)


COPD: No


DVT: No





- Surgical History


Appendectomy: Yes





- Reproductive History


 (#): 2


Para: 0


Spontaneous : 1





- Immunization History


Immunization Up to Date: Yes





- Suicide/Smoking/Psychosocial Hx


Smoking Status: No


Smoking History: Never smoked


Have you smoked in the past 12 months: No


Number of Cigarettes Smoked Daily: 0


If you are a former smoker, when did you quit?: 2MONTHS


Hx Alcohol Use: No


Drug/Substance Use Hx: No


Substance Use Type: None





<Larry Palacios - Last Filed: 18 21:44>





- Past Medical History


Allergies/Adverse Reactions: 


 Allergies











Allergy/AdvReac Type Severity Reaction Status Date / Time


 


Penicillins Allergy Mild  Verified 18 20:38


 


amoxicillin [Amoxicillin] Allergy   Verified 18 20:38











Home Medications: 


Ambulatory Orders





NK [No Known Home Medication]  18 











Neuro Specific PMHX





- Complaint Specific PMHX


Migraine: Yes





<Larry Palacios - Last Filed: 18 21:44>





*Physical Exam





- Vital Signs


 Last Vital Signs











Temp Pulse Resp BP Pulse Ox


 


 98.7 F   92 H  16   112/85   100 


 


 18 20:15  18 20:15  18 20:15  18 20:15  18 20:15














<Mathew Russell - Last Filed: 18 21:19>





ED Treatment Course





- ADDITIONAL ORDERS


Additional order review: 


 Laboratory  Results











  18





  20:54


 


Urine HCG, Qual  Negative














- Medications


Given in the ED: 


ED Medications














Discontinued Medications














Generic Name Dose Route Start Last Admin





  Trade Name Yoandy  PRN Reason Stop Dose Admin


 


Metoclopramide HCl  10 mg  18 20:58  18 20:59





  Reglan Injection -  IVPUSH  18 20:59  10 mg





  ONCE ONE   Administration














<Mathew Russell - Last Filed: 18 21:19>





*DC/Admit/Observation/Transfer





- Attestations


Scribe Attestion: 





18 21:19





Documentation prepared by Mathew Russell, acting as medical scribe for 

Larry Palacios MD.





<Mathew Russell - Last Filed: 18 21:19>





- Discharge Dispostion


Decision to Admit order: No





<Larry Palacios - Last Filed: 18 21:44>


Diagnosis at time of Disposition: 


 Dysmenorrhea





Migraine


Qualifiers:


 Migraine type: unspecified Status migrainosus presence: without status 

migrainosus Intractability: not intractable Qualified Code(s): G43.909 - 

Migraine, unspecified, not intractable, without status migrainosus








- Discharge Dispostion


Disposition: HOME


Condition at time of disposition: Stable





- Patient Instructions


Additional Instructions: 


Return to the emergency department immediately with ANY new, persistent or 

worsening symptoms.





Continue any medications as previously prescribed by your physician.





You should follow up with your primary doctor as soon as possible regarding 

today's emergency department visit.


.


Please make sure your doctor reviews the results of your emergency evaluation.





Thank you for coming to the   Emergency Department today for your care. It was 

a pleasure to see you today. Please note that your evaluation is INCOMPLETE 

until you  follow-up with your doctor.